# Patient Record
Sex: MALE | NOT HISPANIC OR LATINO | URBAN - METROPOLITAN AREA
[De-identification: names, ages, dates, MRNs, and addresses within clinical notes are randomized per-mention and may not be internally consistent; named-entity substitution may affect disease eponyms.]

---

## 2017-12-14 ENCOUNTER — INPATIENT (INPATIENT)
Facility: HOSPITAL | Age: 27
LOS: 1 days | Discharge: HOME | DRG: 101 | End: 2017-12-16
Attending: PSYCHIATRY & NEUROLOGY | Admitting: PSYCHIATRY & NEUROLOGY
Payer: COMMERCIAL

## 2017-12-14 VITALS
SYSTOLIC BLOOD PRESSURE: 150 MMHG | HEART RATE: 120 BPM | DIASTOLIC BLOOD PRESSURE: 86 MMHG | RESPIRATION RATE: 16 BRPM | TEMPERATURE: 98 F | OXYGEN SATURATION: 95 %

## 2017-12-14 DIAGNOSIS — E87.2 ACIDOSIS: ICD-10-CM

## 2017-12-14 DIAGNOSIS — R56.9 UNSPECIFIED CONVULSIONS: ICD-10-CM

## 2017-12-14 DIAGNOSIS — R63.8 OTHER SYMPTOMS AND SIGNS CONCERNING FOOD AND FLUID INTAKE: ICD-10-CM

## 2017-12-14 DIAGNOSIS — R52 PAIN, UNSPECIFIED: ICD-10-CM

## 2017-12-14 DIAGNOSIS — Z00.00 ENCOUNTER FOR GENERAL ADULT MEDICAL EXAMINATION WITHOUT ABNORMAL FINDINGS: ICD-10-CM

## 2017-12-14 DIAGNOSIS — Z29.9 ENCOUNTER FOR PROPHYLACTIC MEASURES, UNSPECIFIED: ICD-10-CM

## 2017-12-14 LAB
ALBUMIN SERPL ELPH-MCNC: 4.1 G/DL — SIGNIFICANT CHANGE UP (ref 3.4–5)
ALP SERPL-CCNC: 90 U/L — SIGNIFICANT CHANGE UP (ref 40–120)
ALT FLD-CCNC: 41 U/L — SIGNIFICANT CHANGE UP (ref 12–42)
ANION GAP SERPL CALC-SCNC: 7 MMOL/L — LOW (ref 9–16)
APPEARANCE UR: CLEAR — SIGNIFICANT CHANGE UP
AST SERPL-CCNC: 37 U/L — SIGNIFICANT CHANGE UP (ref 15–37)
BILIRUB SERPL-MCNC: 1.4 MG/DL — HIGH (ref 0.2–1.2)
BILIRUB UR-MCNC: NEGATIVE — SIGNIFICANT CHANGE UP
BUN SERPL-MCNC: 11 MG/DL — SIGNIFICANT CHANGE UP (ref 7–23)
CALCIUM SERPL-MCNC: 9.2 MG/DL — SIGNIFICANT CHANGE UP (ref 8.5–10.5)
CHLORIDE SERPL-SCNC: 101 MMOL/L — SIGNIFICANT CHANGE UP (ref 96–108)
CK SERPL-CCNC: 3427 U/L — HIGH (ref 30–200)
CO2 SERPL-SCNC: 30 MMOL/L — SIGNIFICANT CHANGE UP (ref 22–31)
COLOR SPEC: YELLOW — SIGNIFICANT CHANGE UP
CREAT SERPL-MCNC: 1.13 MG/DL — SIGNIFICANT CHANGE UP (ref 0.5–1.3)
DIFF PNL FLD: NEGATIVE — SIGNIFICANT CHANGE UP
ETHANOL SERPL-MCNC: <3 MG/DL — SIGNIFICANT CHANGE UP
GLUCOSE SERPL-MCNC: 107 MG/DL — HIGH (ref 70–99)
GLUCOSE UR QL: NEGATIVE — SIGNIFICANT CHANGE UP
HCT VFR BLD CALC: 45 % — SIGNIFICANT CHANGE UP (ref 39–50)
HGB BLD-MCNC: 15.7 G/DL — SIGNIFICANT CHANGE UP (ref 13–17)
HIV 1+2 AB+HIV1 P24 AG SERPL QL IA: SIGNIFICANT CHANGE UP
KETONES UR-MCNC: NEGATIVE — SIGNIFICANT CHANGE UP
LACTATE SERPL-SCNC: 1.6 MMOL/L — SIGNIFICANT CHANGE UP (ref 0.5–2)
LACTATE SERPL-SCNC: 2.3 MMOL/L — HIGH (ref 0.4–2)
LEUKOCYTE ESTERASE UR-ACNC: NEGATIVE — SIGNIFICANT CHANGE UP
MAGNESIUM SERPL-MCNC: 2.2 MG/DL — SIGNIFICANT CHANGE UP (ref 1.6–2.6)
MCHC RBC-ENTMCNC: 31.6 PG — SIGNIFICANT CHANGE UP (ref 27–34)
MCHC RBC-ENTMCNC: 34.9 G/DL — SIGNIFICANT CHANGE UP (ref 32–36)
MCV RBC AUTO: 90.5 FL — SIGNIFICANT CHANGE UP (ref 80–100)
NITRITE UR-MCNC: NEGATIVE — SIGNIFICANT CHANGE UP
PCP SPEC-MCNC: SIGNIFICANT CHANGE UP
PH UR: 6.5 — SIGNIFICANT CHANGE UP (ref 5–8)
PLATELET # BLD AUTO: 162 K/UL — SIGNIFICANT CHANGE UP (ref 150–400)
POTASSIUM SERPL-MCNC: 3.9 MMOL/L — SIGNIFICANT CHANGE UP (ref 3.5–5.3)
POTASSIUM SERPL-SCNC: 3.9 MMOL/L — SIGNIFICANT CHANGE UP (ref 3.5–5.3)
PROT SERPL-MCNC: 7.1 G/DL — SIGNIFICANT CHANGE UP (ref 6.4–8.2)
PROT UR-MCNC: NEGATIVE MG/DL — SIGNIFICANT CHANGE UP
RBC # BLD: 4.97 M/UL — SIGNIFICANT CHANGE UP (ref 4.2–5.8)
RBC # FLD: 12.5 % — SIGNIFICANT CHANGE UP (ref 10.3–16.9)
SODIUM SERPL-SCNC: 138 MMOL/L — SIGNIFICANT CHANGE UP (ref 132–145)
SP GR SPEC: 1.02 — SIGNIFICANT CHANGE UP (ref 1–1.03)
UROBILINOGEN FLD QL: 0.2 E.U./DL — SIGNIFICANT CHANGE UP
WBC # BLD: 6.7 K/UL — SIGNIFICANT CHANGE UP (ref 3.8–10.5)
WBC # FLD AUTO: 6.7 K/UL — SIGNIFICANT CHANGE UP (ref 3.8–10.5)

## 2017-12-14 PROCEDURE — 73030 X-RAY EXAM OF SHOULDER: CPT | Mod: 26,RT

## 2017-12-14 PROCEDURE — 99223 1ST HOSP IP/OBS HIGH 75: CPT

## 2017-12-14 PROCEDURE — 95951: CPT | Mod: 26

## 2017-12-14 PROCEDURE — 70450 CT HEAD/BRAIN W/O DYE: CPT | Mod: 26

## 2017-12-14 PROCEDURE — 99285 EMERGENCY DEPT VISIT HI MDM: CPT | Mod: 25

## 2017-12-14 RX ORDER — ACETAMINOPHEN 500 MG
650 TABLET ORAL EVERY 6 HOURS
Qty: 0 | Refills: 0 | Status: DISCONTINUED | OUTPATIENT
Start: 2017-12-14 | End: 2017-12-15

## 2017-12-14 RX ORDER — INFLUENZA VIRUS VACCINE 15; 15; 15; 15 UG/.5ML; UG/.5ML; UG/.5ML; UG/.5ML
0.5 SUSPENSION INTRAMUSCULAR ONCE
Qty: 0 | Refills: 0 | Status: COMPLETED | OUTPATIENT
Start: 2017-12-14 | End: 2017-12-14

## 2017-12-14 RX ORDER — TETANUS TOXOID, REDUCED DIPHTHERIA TOXOID AND ACELLULAR PERTUSSIS VACCINE, ADSORBED 5; 2.5; 8; 8; 2.5 [IU]/.5ML; [IU]/.5ML; UG/.5ML; UG/.5ML; UG/.5ML
0.5 SUSPENSION INTRAMUSCULAR ONCE
Qty: 0 | Refills: 0 | Status: COMPLETED | OUTPATIENT
Start: 2017-12-14 | End: 2017-12-14

## 2017-12-14 RX ORDER — SODIUM CHLORIDE 9 MG/ML
3 INJECTION INTRAMUSCULAR; INTRAVENOUS; SUBCUTANEOUS ONCE
Qty: 0 | Refills: 0 | Status: COMPLETED | OUTPATIENT
Start: 2017-12-14 | End: 2017-12-14

## 2017-12-14 RX ORDER — OXYCODONE AND ACETAMINOPHEN 5; 325 MG/1; MG/1
1 TABLET ORAL ONCE
Qty: 0 | Refills: 0 | Status: DISCONTINUED | OUTPATIENT
Start: 2017-12-14 | End: 2017-12-15

## 2017-12-14 RX ORDER — SODIUM CHLORIDE 9 MG/ML
1000 INJECTION INTRAMUSCULAR; INTRAVENOUS; SUBCUTANEOUS ONCE
Qty: 0 | Refills: 0 | Status: COMPLETED | OUTPATIENT
Start: 2017-12-14 | End: 2017-12-14

## 2017-12-14 RX ORDER — KETOROLAC TROMETHAMINE 30 MG/ML
15 SYRINGE (ML) INJECTION ONCE
Qty: 0 | Refills: 0 | Status: DISCONTINUED | OUTPATIENT
Start: 2017-12-14 | End: 2017-12-14

## 2017-12-14 RX ADMIN — SODIUM CHLORIDE 3 MILLILITER(S): 9 INJECTION INTRAMUSCULAR; INTRAVENOUS; SUBCUTANEOUS at 04:06

## 2017-12-14 RX ADMIN — SODIUM CHLORIDE 1000 MILLILITER(S): 9 INJECTION INTRAMUSCULAR; INTRAVENOUS; SUBCUTANEOUS at 04:33

## 2017-12-14 RX ADMIN — Medication 1 MILLIGRAM(S): at 04:20

## 2017-12-14 RX ADMIN — Medication 15 MILLIGRAM(S): at 19:29

## 2017-12-14 RX ADMIN — Medication 15 MILLIGRAM(S): at 22:29

## 2017-12-14 RX ADMIN — TETANUS TOXOID, REDUCED DIPHTHERIA TOXOID AND ACELLULAR PERTUSSIS VACCINE, ADSORBED 0.5 MILLILITER(S): 5; 2.5; 8; 8; 2.5 SUSPENSION INTRAMUSCULAR at 05:47

## 2017-12-14 NOTE — ED PROVIDER NOTE - ATTENDING CONTRIBUTION TO CARE
patient with new onset seizure. labs wnl. discussion made with neuro per NP note. agree with management and admission.
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
## 2017-12-14 NOTE — H&P ADULT - PROBLEM SELECTOR PLAN 1
Pt presented after witnessed seizure   - vEEG in place   - monitor electrolytes Pt presented after witnessed seizure   - vEEG in place   - monitor electrolytes  - f/u CK level  - CT head negative for acute pathology

## 2017-12-14 NOTE — H&P ADULT - PROBLEM SELECTOR PLAN 3
F: No IVF  E: Replete PRN   N: Regular diet - HIV testing   - tobacco cessation   - drug cessation provided for percocet

## 2017-12-14 NOTE — H&P ADULT - PROBLEM SELECTOR PLAN 2
- HIV testing   - tobacco cessation   - drug cessation provided for percocet Elevated to 2.4 likely 2/2 to seizure activity   - will f/u 6pm level, if elevated will start gentle hydration

## 2017-12-14 NOTE — ED ADULT TRIAGE NOTE - CHIEF COMPLAINT QUOTE
Pt BIBEMS after pt had a witnessed seizure at work. Pt was washing dishes at the time and got lacerations to the left 5th finger and right palm. Pt also complaining of upper lip swelling stating "I hit it when I had the seizure."  Reports seizure when he was 19 but never got any medical care at the time.

## 2017-12-14 NOTE — ED PROVIDER NOTE - PROGRESS NOTE DETAILS
Pt discussed with Dr. Montgomery, epileptologist on call.  Dr. Montgomery encourages admission.  Pt amenable. Dr. Montgomery.  [place] discussed via transfer center.

## 2017-12-14 NOTE — ED PROVIDER NOTE - MEDICAL DECISION MAKING DETAILS
26 y/o M presents to ED s/p episode of possible seizure like activity.  Labs wnl, CT head.  Pt advised to avoid driving, operating machinery, drinking alcohol, illicit drug use.

## 2017-12-14 NOTE — ED PROVIDER NOTE - OBJECTIVE STATEMENT
26 y/o M on oxycodone for chronic L knee and lumbar back pain presents to ED s/p episode of loss of consciousness.  The fall was witnessed by sound.  He was then found by coworkers shaking on the floor.  Pt last remembers washing a glass at work and woke up to coworkers telling him to stand up.  He states he had an episode of sudden loss of consciousness approx 5 years ago.  Pt has bite deisi to upper lip.  He c/o mild headache and pain to small laceration to L small finger and R wrist.  He denies urinary incontinence, neck or back pain, illicit drug use.

## 2017-12-14 NOTE — H&P ADULT - NSHPSOCIALHISTORY_GEN_ALL_CORE
Smokes 5 cigarettes a day Smokes 5 cigarettes a day since he was 20, but inconsistently   EtOH denies use  Illicit drug use- marijuana daily, admits to various other drugs over the past year   Drugs: pt had knee surgery in the past for which he was prescribed percocet. He states that he buys them on the streets now for "pain" however takes them intermittently   Denies recent sexual activity, however has been sexually active in the past year with women, uses protection, no STI

## 2017-12-14 NOTE — H&P ADULT - ASSESSMENT
This is a 27 year old male with no significant past medical history who presents from [facility] after a witnessed seizure. Admitted for vEEG This is a 27 year old male with no significant past medical history who presents from [facility] after a witnessed seizure. Possible post-traumatic due to history of significant concussion. No MRI recently.  CT head negative.  Admitted for vEEG and consideration for treatment.

## 2017-12-14 NOTE — H&P ADULT - PROBLEM SELECTOR PLAN 6
Patient reporting chronic left knee pain, which he obtains and takes percocet up to 3 tabs/week.  Now with left hip flexor pain, no rebound etc, appears tendon/muscle.    Will first try toradol as anti-inflammatory, but one oxycodone 5mg/d PRN

## 2017-12-14 NOTE — ED PROVIDER NOTE - NS_EDPROVIDERDISPOUSERTYPE_ED_A_ED
I have personally evaluated and examined the patient. The Attending was available to me as a supervising provider if needed. Attending Attestation (For Attendings USE Only).../I have personally evaluated and examined the patient. The Attending was available to me as a supervising provider if needed. Attending Attestation (For Attendings USE Only)...

## 2017-12-14 NOTE — H&P ADULT - NSHPPHYSICALEXAM_GEN_ALL_CORE
.  VITAL SIGNS:  T(F): 97.9 (12-14-17 @ 15:59), Max: 98.7 (12-14-17 @ 05:51)  HR: 87 (12-14-17 @ 15:59) (62 - 120)  BP: 128/71 (12-14-17 @ 15:59) (118/74 - 150/86)  BP(mean): --  RR: 15 (12-14-17 @ 15:59) (15 - 18)  SpO2: 97% (12-14-17 @ 15:59) (95% - 100%)    PHYSICAL EXAM:    Constitutional: WDWN resting comfortably in bed; NAD  HEENT: NC/AT, PERRL, EOMI, anicteric sclera, no nasal discharge; uvula midline, no oropharyngeal erythema or exudates; MMM  Neck: supple; no JVD or thyromegaly  Respiratory: CTA B/L; no W/R/R, no retractions  Cardiac: +S1/S2; RRR; no M/R/G; PMI non-displaced  Gastrointestinal: soft, NT/ND; no rebound or guarding; +BSx4  Back: no CVAT B/L  Extremities: WWP, no clubbing or cyanosis; no peripheral edema  Musculoskeletal: NROM x4; no joint swelling, tenderness or erythema  Vascular: 2+ radial, DP/PT pulses B/L  Dermatologic: skin warm, dry and intact; no rashes, wounds, or scars  Neurologic: AAOx3; CNII-XII grossly intact; no focal deficits, 5/5 strength in all four extremities, sensation intact throughout, no dysmetria on finger to nose test  Psychiatric: affect and characteristics of appearance, verbalizations, behaviors are appropriate, denies SI/HI/AH/VH .  VITAL SIGNS:  T(F): 97.9 (12-14-17 @ 15:59), Max: 98.7 (12-14-17 @ 05:51)  HR: 87 (12-14-17 @ 15:59) (62 - 120)  BP: 128/71 (12-14-17 @ 15:59) (118/74 - 150/86)  BP(mean): --  RR: 15 (12-14-17 @ 15:59) (15 - 18)  SpO2: 97% (12-14-17 @ 15:59) (95% - 100%)    PHYSICAL EXAM:    Constitutional: WDWN resting comfortably in bed; NAD  HEENT: NC/AT, PERRL, EOMI, anicteric sclera, no nasal discharge; uvula midline, no oropharyngeal erythema or exudates; MMM  Neck: supple; no JVD or thyromegaly  Respiratory: CTA B/L; no W/R/R, no retractions  Cardiac: +S1/S2; RRR; no M/R/G; PMI non-displaced  Gastrointestinal: soft, NT/ND; no rebound or guarding; +BSx4  Back: no CVAT B/L  Extremities: WWP, no clubbing or cyanosis; no peripheral edema  Musculoskeletal: NROM x4; no joint swelling, tenderness or erythema; c/o left hip flexor pain/weakness when attempting to lift it.  Vascular: 2+ radial, DP/PT pulses B/L  Dermatologic: skin warm, dry and intact; no rashes, wounds, or scars  Neurologic: AAOx3; CNII-XII grossly intact; no focal deficits, 5/5 strength in all four extremities, sensation intact throughout, no dysmetria on finger to nose test;  Left HF tested with full power, though some pain.  Psychiatric: affect and characteristics of appearance, verbalizations, behaviors are appropriate, denies SI/HI/AH/VH

## 2017-12-14 NOTE — H&P ADULT - NSHPLABSRESULTS_GEN_ALL_CORE
.  LABS:                         15.7   6.7   )-----------( 162      ( 14 Dec 2017 03:49 )             45.0     12-    138  |  101  |  11  ----------------------------<  107<H>  3.9   |  30  |  1.13    Ca    9.2      14 Dec 2017 03:49    TPro  7.1  /  Alb  4.1  /  TBili  1.4<H>  /  DBili  x   /  AST  37  /  ALT  41  /  AlkPhos  90  12-      Urinalysis Basic - ( 14 Dec 2017 05:38 )    Color: Yellow / Appearance: Clear / S.025 / pH: x  Gluc: x / Ketone: NEGATIVE  / Bili: NEGATIVE / Urobili: 0.2 E.U./dL   Blood: x / Protein: NEGATIVE mg/dL / Nitrite: NEGATIVE   Leuk Esterase: NEGATIVE / RBC: x / WBC x   Sq Epi: x / Non Sq Epi: x / Bacteria: x                RADIOLOGY, EKG & ADDITIONAL TESTS: Reviewed.   < from: CT Head No Cont (17 @ 04:08) >    IMPRESSION:     No acute intracranial hemorrhagic or ischemic infarction. No mass or   hydrocephaly.    < end of copied text >

## 2017-12-15 ENCOUNTER — TRANSCRIPTION ENCOUNTER (OUTPATIENT)
Age: 27
End: 2017-12-15

## 2017-12-15 DIAGNOSIS — G47.26 CIRCADIAN RHYTHM SLEEP DISORDER, SHIFT WORK TYPE: ICD-10-CM

## 2017-12-15 LAB
ALBUMIN SERPL ELPH-MCNC: 4.5 G/DL — SIGNIFICANT CHANGE UP (ref 3.3–5)
ALP SERPL-CCNC: 75 U/L — SIGNIFICANT CHANGE UP (ref 40–120)
ALT FLD-CCNC: 25 U/L — SIGNIFICANT CHANGE UP (ref 10–45)
ANION GAP SERPL CALC-SCNC: 12 MMOL/L — SIGNIFICANT CHANGE UP (ref 5–17)
AST SERPL-CCNC: 41 U/L — HIGH (ref 10–40)
BASOPHILS NFR BLD AUTO: 0.3 % — SIGNIFICANT CHANGE UP (ref 0–2)
BILIRUB SERPL-MCNC: 1.3 MG/DL — HIGH (ref 0.2–1.2)
BUN SERPL-MCNC: 13 MG/DL — SIGNIFICANT CHANGE UP (ref 7–23)
CALCIUM SERPL-MCNC: 9.4 MG/DL — SIGNIFICANT CHANGE UP (ref 8.4–10.5)
CHLORIDE SERPL-SCNC: 100 MMOL/L — SIGNIFICANT CHANGE UP (ref 96–108)
CK SERPL-CCNC: 2456 U/L — HIGH (ref 30–200)
CO2 SERPL-SCNC: 26 MMOL/L — SIGNIFICANT CHANGE UP (ref 22–31)
CREAT SERPL-MCNC: 0.88 MG/DL — SIGNIFICANT CHANGE UP (ref 0.5–1.3)
EOSINOPHIL NFR BLD AUTO: 1.5 % — SIGNIFICANT CHANGE UP (ref 0–6)
GLUCOSE SERPL-MCNC: 90 MG/DL — SIGNIFICANT CHANGE UP (ref 70–99)
HCT VFR BLD CALC: 44.9 % — SIGNIFICANT CHANGE UP (ref 39–50)
HGB BLD-MCNC: 15.6 G/DL — SIGNIFICANT CHANGE UP (ref 13–17)
LYMPHOCYTES # BLD AUTO: 28.7 % — SIGNIFICANT CHANGE UP (ref 13–44)
MAGNESIUM SERPL-MCNC: 2.3 MG/DL — SIGNIFICANT CHANGE UP (ref 1.6–2.6)
MCHC RBC-ENTMCNC: 31.8 PG — SIGNIFICANT CHANGE UP (ref 27–34)
MCHC RBC-ENTMCNC: 34.7 G/DL — SIGNIFICANT CHANGE UP (ref 32–36)
MCV RBC AUTO: 91.4 FL — SIGNIFICANT CHANGE UP (ref 80–100)
MONOCYTES NFR BLD AUTO: 8.6 % — SIGNIFICANT CHANGE UP (ref 2–14)
NEUTROPHILS NFR BLD AUTO: 60.9 % — SIGNIFICANT CHANGE UP (ref 43–77)
PLATELET # BLD AUTO: 150 K/UL — SIGNIFICANT CHANGE UP (ref 150–400)
POTASSIUM SERPL-MCNC: 4 MMOL/L — SIGNIFICANT CHANGE UP (ref 3.5–5.3)
POTASSIUM SERPL-SCNC: 4 MMOL/L — SIGNIFICANT CHANGE UP (ref 3.5–5.3)
PROT SERPL-MCNC: 6.9 G/DL — SIGNIFICANT CHANGE UP (ref 6–8.3)
RBC # BLD: 4.91 M/UL — SIGNIFICANT CHANGE UP (ref 4.2–5.8)
RBC # FLD: 13.3 % — SIGNIFICANT CHANGE UP (ref 10.3–16.9)
SODIUM SERPL-SCNC: 138 MMOL/L — SIGNIFICANT CHANGE UP (ref 135–145)
WBC # BLD: 6.5 K/UL — SIGNIFICANT CHANGE UP (ref 3.8–10.5)
WBC # FLD AUTO: 6.5 K/UL — SIGNIFICANT CHANGE UP (ref 3.8–10.5)

## 2017-12-15 PROCEDURE — 99232 SBSQ HOSP IP/OBS MODERATE 35: CPT

## 2017-12-15 PROCEDURE — 95951: CPT | Mod: 26

## 2017-12-15 RX ORDER — ZONISAMIDE 100 MG
25 CAPSULE ORAL
Qty: 0 | Refills: 0 | Status: DISCONTINUED | OUTPATIENT
Start: 2017-12-15 | End: 2017-12-16

## 2017-12-15 RX ORDER — KETOROLAC TROMETHAMINE 30 MG/ML
15 SYRINGE (ML) INJECTION ONCE
Qty: 0 | Refills: 0 | Status: DISCONTINUED | OUTPATIENT
Start: 2017-12-15 | End: 2017-12-15

## 2017-12-15 RX ORDER — IBUPROFEN 200 MG
400 TABLET ORAL EVERY 6 HOURS
Qty: 0 | Refills: 0 | Status: DISCONTINUED | OUTPATIENT
Start: 2017-12-15 | End: 2017-12-16

## 2017-12-15 RX ORDER — ZONISAMIDE 100 MG
25 CAPSULE ORAL ONCE
Qty: 0 | Refills: 0 | Status: COMPLETED | OUTPATIENT
Start: 2017-12-15 | End: 2017-12-15

## 2017-12-15 RX ADMIN — Medication 15 MILLIGRAM(S): at 21:58

## 2017-12-15 RX ADMIN — Medication 25 MILLIGRAM(S): at 21:58

## 2017-12-15 RX ADMIN — OXYCODONE AND ACETAMINOPHEN 1 TABLET(S): 5; 325 TABLET ORAL at 06:12

## 2017-12-15 RX ADMIN — OXYCODONE AND ACETAMINOPHEN 1 TABLET(S): 5; 325 TABLET ORAL at 01:47

## 2017-12-15 RX ADMIN — Medication 650 MILLIGRAM(S): at 08:40

## 2017-12-15 RX ADMIN — Medication 25 MILLIGRAM(S): at 12:53

## 2017-12-15 RX ADMIN — Medication 650 MILLIGRAM(S): at 08:56

## 2017-12-15 RX ADMIN — Medication 15 MILLIGRAM(S): at 22:13

## 2017-12-15 NOTE — PROGRESS NOTE ADULT - PROBLEM SELECTOR PLAN 6
Patient reporting chronic left knee pain, which he obtains and takes percocet up to 3 tabs/week.  Now with left hip flexor pain, no rebound etc, appears tendon/muscle.    Will first try toradol as anti-inflammatory, but one oxycodone 5mg/d PRN DVT: IMPROVE 0, ICDs    Full code  Dispo: 7Wo

## 2017-12-15 NOTE — PROGRESS NOTE ADULT - SUBJECTIVE AND OBJECTIVE BOX
INTERVAL HPI/OVERNIGHT EVENTS:  Patient was seen and examined at bedside. As per nurse and patient, no o/n events, patient resting comfortably. Patient denies: fever, chills, dizziness, weakness,  Changes in vision, CP, palpitations, SOB, cough, N/V/D/C, dysuria, changes in bowel movements, LE edema. Pt denies any convulsions at this time. He notes that he is having right shoulder and left hip pain, likely from where he fell    VITAL SIGNS:  T(F): 97.3 (12-15-17 @ 05:22)  HR: 73 (12-15-17 @ 05:22)  BP: 130/75 (12-15-17 @ 05:22)  RR: 15 (12-15-17 @ 05:22)  SpO2: 97% (12-15-17 @ :22)  Wt(kg): --    PHYSICAL EXAM:    Constitutional: WDWN, NAD  Eyes: PERRL, EOMI, sclera non-icteric  Neck: supple, trachea midline, no masses, no JVD  Respiratory: CTA b/l, good air entry b/l, no wheezing, no rhonchi, no rales, without accessory muscle use and no intercostal retractions  Cardiovascular: RRR, normal S1S2, no M/R/G  Gastrointestinal: soft, NTND, no masses palpable, BS normal  Extremities: Warm, well perfused, pulses equal bilateral upper and lower extremities, no edema, no clubbing  Neurological: AAOx3, CN Grossly intact  Skin: Normal temperature, warm, dry    MEDICATIONS  (STANDING):    MEDICATIONS  (PRN):  acetaminophen   Tablet. 650 milliGRAM(s) Oral every 6 hours PRN Moderate Pain (4 - 6)  LORazepam   Injectable 1 milliGRAM(s) IntraMuscular once PRN seizure      Allergies    No Known Allergies    Intolerances        LABS:                        15.6   6.5   )-----------( 150      ( 15 Dec 2017 07:39 )             44.9     -15    138  |  100  |  13  ----------------------------<  90  4.0   |  26  |  0.88    Ca    9.4      15 Dec 2017 07:39  Mg     2.3     12-15    TPro  6.9  /  Alb  4.5  /  TBili  1.3<H>  /  DBili  x   /  AST  41<H>  /  ALT  25  /  AlkPhos  75  12-15      Urinalysis Basic - ( 14 Dec 2017 05:38 )    Color: Yellow / Appearance: Clear / S.025 / pH: x  Gluc: x / Ketone: NEGATIVE  / Bili: NEGATIVE / Urobili: 0.2 E.U./dL   Blood: x / Protein: NEGATIVE mg/dL / Nitrite: NEGATIVE   Leuk Esterase: NEGATIVE / RBC: x / WBC x   Sq Epi: x / Non Sq Epi: x / Bacteria: x        RADIOLOGY & ADDITIONAL TESTS: INTERVAL HPI/OVERNIGHT EVENTS:  Patient was seen and examined at bedside. As per nurse and patient, no o/n events, patient resting comfortably. Patient denies: fever, chills, dizziness, weakness,  Changes in vision, CP, palpitations, SOB, cough, N/V/D/C, dysuria, changes in bowel movements, LE edema. Pt denies any convulsions at this time. He notes that he is having right shoulder and left hip pain, likely from where he fell. He notes that he has a HA in the occipital region, unsure if it is a HA vs where he fell vs EEG leads. Currently he does not have a HA.     VITAL SIGNS:  T(F): 97.3 (12-15-17 @ 05:22)  HR: 73 (12-15-17 @ 05:22)  BP: 130/75 (12-15-17 @ 05:22)  RR: 15 (12-15-17 @ 05:22)  SpO2: 97% (12-15-17 @ 05:22)  Wt(kg): --    PHYSICAL EXAM:    Constitutional: WDWN resting comfortably in bed; NAD  HEENT: NC/AT, PERRL, EOMI, anicteric sclera, no nasal discharge; uvula midline, no oropharyngeal erythema or exudates; MMM  Neck: supple; no JVD or thyromegaly  Respiratory: CTA B/L; no W/R/R, no retractions  Cardiac: +S1/S2; RRR; no M/R/G; PMI non-displaced  Gastrointestinal: soft, NT/ND; no rebound or guarding; +BSx4  Back: no CVAT B/L  Extremities: WWP, no clubbing or cyanosis; no peripheral edema  Musculoskeletal: NROM x4; no joint swelling, tenderness or erythema; c/o left hip flexor pain/weakness when attempting to lift it.  Vascular: 2+ radial, DP/PT pulses B/L  Dermatologic: skin warm, dry and intact; no rashes, wounds, or scars  Neurologic: AAOx3; CNII-XII grossly intact; no focal deficits, 5/5 strength in all four extremities, sensation intact throughout, no dysmetria on finger to nose test;  Left HF tested with full power, though some pain.  Psychiatric: affect and characteristics of appearance, verbalizations, behaviors are appropriate, denies SI/HI/AH/VH    MEDICATIONS  (STANDING):    MEDICATIONS  (PRN):  acetaminophen   Tablet. 650 milliGRAM(s) Oral every 6 hours PRN Moderate Pain (4 - 6)  LORazepam   Injectable 1 milliGRAM(s) IntraMuscular once PRN seizure      Allergies    No Known Allergies    Intolerances        LABS:                        15.6   6.5   )-----------( 150      ( 15 Dec 2017 07:39 )             44.9     12-15    138  |  100  |  13  ----------------------------<  90  4.0   |  26  |  0.88    Ca    9.4      15 Dec 2017 07:39  Mg     2.3     12-15    TPro  6.9  /  Alb  4.5  /  TBili  1.3<H>  /  DBili  x   /  AST  41<H>  /  ALT  25  /  AlkPhos  75  12-15      Urinalysis Basic - ( 14 Dec 2017 05:38 )    Color: Yellow / Appearance: Clear / S.025 / pH: x  Gluc: x / Ketone: NEGATIVE  / Bili: NEGATIVE / Urobili: 0.2 E.U./dL   Blood: x / Protein: NEGATIVE mg/dL / Nitrite: NEGATIVE   Leuk Esterase: NEGATIVE / RBC: x / WBC x   Sq Epi: x / Non Sq Epi: x / Bacteria: x        RADIOLOGY & ADDITIONAL TESTS: INTERVAL HPI/OVERNIGHT EVENTS:  Patient was seen and examined at bedside. As per nurse and patient, no o/n events, patient resting comfortably. Patient denies: fever, chills, dizziness, weakness,  Changes in vision, CP, palpitations, SOB, cough, N/V/D/C, dysuria, changes in bowel movements, LE edema. Pt denies any convulsions at this time. He notes that he is having right shoulder and left hip pain, likely from where he fell. He notes that he has a HA in the occipital region, unsure if it is a HA vs where he fell vs EEG leads, though does note the left occipital region. Currently he does not have a HA.     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    He reports having extremely difficulty with his sleep cycle, working nights and coming home and often not being able to sleep due to light and restlessness.    VITAL SIGNS:  T(F): 97.3 (12-15-17 @ 05:22)  HR: 73 (12-15-17 @ 05:22)  BP: 130/75 (12-15-17 @ 05:22)  RR: 15 (12-15-17 @ 05:22)  SpO2: 97% (12-15-17 @ 05:22)  Wt(kg): --    PHYSICAL EXAM:    Constitutional: WDWN resting comfortably in bed; NAD  HEENT: NC/AT, PERRL, EOMI, anicteric sclera, no nasal discharge; uvula midline, no oropharyngeal erythema or exudates; MMM  Neck: supple; no JVD or thyromegaly  Respiratory: CTA B/L; no W/R/R, no retractions  Cardiac: +S1/S2; RRR; no M/R/G; PMI non-displaced  Gastrointestinal: soft, NT/ND; no rebound or guarding; +BSx4  Back: no CVAT B/L  Extremities: WWP, no clubbing or cyanosis; no peripheral edema  Musculoskeletal: NROM x4; no joint swelling, tenderness or erythema; c/o left hip flexor pain, reproducible with palpation of the muscle belly (?iliacus)  Vascular: 2+ radial, DP/PT pulses B/L  Dermatologic: skin warm, dry and intact; no rashes, wounds, or scars  Neurologic: AAOx3; CNII-XII grossly intact; no focal deficits, 5/5 strength in all four extremities, sensation intact throughout, no dysmetria on finger to nose test;  Left HF tested with full power, though some pain.  Psychiatric: affect and characteristics of appearance, verbalizations, behaviors are appropriate, denies SI/HI/AH/VH    MEDICATIONS  (STANDING):    MEDICATIONS  (PRN):  acetaminophen   Tablet. 650 milliGRAM(s) Oral every 6 hours PRN Moderate Pain (4 - 6)  LORazepam   Injectable 1 milliGRAM(s) IntraMuscular once PRN seizure      Allergies    No Known Allergies    Intolerances        LABS:                        15.6   6.5   )-----------( 150      ( 15 Dec 2017 07:39 )             44.9     12-15    138  |  100  |  13  ----------------------------<  90  4.0   |  26  |  0.88    Ca    9.4      15 Dec 2017 07:39  Mg     2.3     12-15    TPro  6.9  /  Alb  4.5  /  TBili  1.3<H>  /  DBili  x   /  AST  41<H>  /  ALT  25  /  AlkPhos  75  12-15      Urinalysis Basic - ( 14 Dec 2017 05:38 )    Color: Yellow / Appearance: Clear / S.025 / pH: x  Gluc: x / Ketone: NEGATIVE  / Bili: NEGATIVE / Urobili: 0.2 E.U./dL   Blood: x / Protein: NEGATIVE mg/dL / Nitrite: NEGATIVE   Leuk Esterase: NEGATIVE / RBC: x / WBC x   Sq Epi: x / Non Sq Epi: x / Bacteria: x        RADIOLOGY & ADDITIONAL TESTS:

## 2017-12-15 NOTE — PROGRESS NOTE ADULT - PROBLEM SELECTOR PLAN 4
F: No IVF  E: Replete PRN   N: Regular diet - HIV testing - negative   - tobacco cessation   - drug cessation provided for percocet

## 2017-12-15 NOTE — PROGRESS NOTE ADULT - PROBLEM SELECTOR PLAN 7
1) recommended melatonin 3-5mg 3 hours prior to sleep  2) needs sensory reduction - black-out blinds or eyemask, earplugs  3) consider insomnia meds if ineffective

## 2017-12-15 NOTE — DISCHARGE NOTE ADULT - PATIENT PORTAL LINK FT
“You can access the [facility] Patient Portal, offered by [facility], by registering with the following website: [email or web address]”

## 2017-12-15 NOTE — PROGRESS NOTE ADULT - PROBLEM SELECTOR PLAN 1
Pt presented after witnessed seizure   - vEEG in place   - monitor electrolytes  - f/u CK level- downtrending no need for further trending   - CT head negative for acute pathology  - started on Zonegran 25MG BID, will uptitrate tomorrow

## 2017-12-15 NOTE — PROGRESS NOTE ADULT - PROBLEM SELECTOR PLAN 3
- HIV testing - negative   - tobacco cessation   - drug cessation provided for percocet RESOLVED   Elevated to 2.4 likely 2/2 to seizure activity , downtrended to 1.6

## 2017-12-15 NOTE — DISCHARGE NOTE ADULT - HOSPITAL COURSE
This is a 27 year old male with no significant past medical history who presents from [facility] after a witnessed seizure. Pt admitted for vEEG and started on Zongran BID. Pt monitored on vEEG for 48 hours with minimal activity. Vitals have remained stable and labs WNL. Pt will follow up with Dr. Montgomery in 1-2 weeks. Patient was medically optimized, stable, and ready for discharge. Plan of care and return precautions were discussed with the patient who verbally stated understanding. This is a 27 year old male with no significant past medical history who presents from [facility] after a witnessed seizure.  Pt admitted for vEEG and started on Zonegran BID. Pt monitored on vEEG for 48 hours with minimal activity. Vitals have remained stable and labs WNL. Pt will follow up with Dr. Montgomery next week. Patient was medically optimized, stable, and ready for discharge. Plan of care and return precautions were discussed with the patient who verbally stated understanding. This is a 27 year old male with no significant past medical history who presents from [facility] after a witnessed seizure.  Pt admitted for vEEG and started on Zonegran 25mg BID. Pt monitored on vEEG for 48 hours with possible F4 focal irritability. Vitals have remained stable and labs WNL. Pt will follow up with Dr. Montgomery next week. Patient was medically optimized, dose increased to ZNS 100mg at bedtime, and ready for discharge. Plan of care and return precautions were discussed with the patient and his mother who both verbally stated understanding.

## 2017-12-15 NOTE — DISCHARGE NOTE ADULT - PLAN OF CARE
Discharge to home. To remain seizure free. You presented after a witnessed seizure at work. You were brought to the hospital and monitored on video EEG for two nights. You were also started on Zonisamide while in the hospital. Please continue to take the medication as prescribed. Please continue to follow up with Dr. Montgomery in 1-2 weeks for post hospitalization check up. Resolved. You presented with an elevated lactate which was likely due to your seizure activity. This resolved to normal fairly quickly with no intervention needed. Stable. You have difficulty with your sleep cycle given being on an apposite schedule for work. We discussed options such as black out curtains, eye masks, and ear buds to help facilitate a good sleep cycle. Please try to implement healthy sleep hygiene to ensure that you are getting enough sleep. If you have any questions, please feel free to contact Dr. Montgomery. You presented after a witnessed seizure at work. You were brought to the hospital and monitored on video EEG for two nights. You were also started on Zonisamide while in the hospital. Please continue to take Zonisamide 100mg at bedtime.  Additionally, follow up with Dr. Montgomery next week for post hospitalization check up. You presented after a witnessed seizure at work. You were brought to the hospital and monitored on video EEG for two nights. You were also started on Zonisamide while in the hospital. Please continue with the increased dose of Zonisamide 100mg at bedtime.  Additionally, follow up with Dr. Montgomery next week for post-hospitalization check up for clearance for work.

## 2017-12-15 NOTE — PROGRESS NOTE ADULT - ASSESSMENT
This is a 27 year old male with no significant past medical history who presents from [facility] after a witnessed seizure. Possible post-traumatic due to history of significant concussion. No MRI recently.  CT head negative.  Admitted for vEEG and consideration for treatment. This is a 27 year old male with no significant past medical history who presents from [facility] after a witnessed seizure, his second in his life. Possible post-traumatic due to history of significant concussion. No MRI recently.  CT head negative.  Admitted for vEEG and consideration for treatment.

## 2017-12-15 NOTE — DISCHARGE NOTE ADULT - CARE PROVIDER_API CALL
Kam Montgomery (MD), Clinical Neurophysiology; EEGEpilepsy; Neurology; Sleep Medicine  [address] [place] [address]  Phone: [phone]  Fax: [phone]

## 2017-12-15 NOTE — DISCHARGE NOTE ADULT - CARE PLAN
Principal Discharge DX:	Seizure  Secondary Diagnosis:	Lactic acidosis  Secondary Diagnosis:	Preventative health care  Secondary Diagnosis:	Sleep disorder, circadian, shift work type Principal Discharge DX:	Seizure  Goal:	Discharge to home. To remain seizure free.  Instructions for follow-up, activity and diet:	You presented after a witnessed seizure at work. You were brought to the hospital and monitored on video EEG for two nights. You were also started on Zonisamide while in the hospital. Please continue to take the medication as prescribed. Please continue to follow up with Dr. Montgomery in 1-2 weeks for post hospitalization check up.  Secondary Diagnosis:	Lactic acidosis  Goal:	Resolved.  Instructions for follow-up, activity and diet:	You presented with an elevated lactate which was likely due to your seizure activity. This resolved to normal fairly quickly with no intervention needed.  Secondary Diagnosis:	Sleep disorder, circadian, shift work type  Goal:	Stable.  Instructions for follow-up, activity and diet:	You have difficulty with your sleep cycle given being on an apposite schedule for work. We discussed options such as black out curtains, eye masks, and ear buds to help facilitate a good sleep cycle. Please try to implement healthy sleep hygiene to ensure that you are getting enough sleep. If you have any questions, please feel free to contact Dr. Montgomery. Principal Discharge DX:	Seizure  Goal:	Discharge to home. To remain seizure free.  Instructions for follow-up, activity and diet:	You presented after a witnessed seizure at work. You were brought to the hospital and monitored on video EEG for two nights. You were also started on Zonisamide while in the hospital. Please continue to take Zonisamide 100mg at bedtime.  Additionally, follow up with Dr. Montgomery next week for post hospitalization check up.  Secondary Diagnosis:	Lactic acidosis  Goal:	Resolved.  Instructions for follow-up, activity and diet:	You presented with an elevated lactate which was likely due to your seizure activity. This resolved to normal fairly quickly with no intervention needed.  Secondary Diagnosis:	Sleep disorder, circadian, shift work type  Goal:	Stable.  Instructions for follow-up, activity and diet:	You have difficulty with your sleep cycle given being on an apposite schedule for work. We discussed options such as black out curtains, eye masks, and ear buds to help facilitate a good sleep cycle. Please try to implement healthy sleep hygiene to ensure that you are getting enough sleep. If you have any questions, please feel free to contact Dr. Montgomery. Principal Discharge DX:	Seizure  Goal:	Discharge to home. To remain seizure free.  Instructions for follow-up, activity and diet:	You presented after a witnessed seizure at work. You were brought to the hospital and monitored on video EEG for two nights. You were also started on Zonisamide while in the hospital. Please continue with the increased dose of Zonisamide 100mg at bedtime.  Additionally, follow up with Dr. Montgomery next week for post-hospitalization check up for clearance for work.  Secondary Diagnosis:	Lactic acidosis  Goal:	Resolved.  Instructions for follow-up, activity and diet:	You presented with an elevated lactate which was likely due to your seizure activity. This resolved to normal fairly quickly with no intervention needed.  Secondary Diagnosis:	Sleep disorder, circadian, shift work type  Goal:	Stable.  Instructions for follow-up, activity and diet:	You have difficulty with your sleep cycle given being on an apposite schedule for work. We discussed options such as black out curtains, eye masks, and ear buds to help facilitate a good sleep cycle. Please try to implement healthy sleep hygiene to ensure that you are getting enough sleep. If you have any questions, please feel free to contact Dr. Montgomery.

## 2017-12-15 NOTE — DISCHARGE NOTE ADULT - ADDITIONAL INSTRUCTIONS
Please follow up with Dr. Montgomery in 1-2 weeks for follow up appointment. Please follow up with Dr. Montgomery within a week for follow up appointment.

## 2017-12-15 NOTE — DISCHARGE NOTE ADULT - MEDICATION SUMMARY - MEDICATIONS TO TAKE
I will START or STAY ON the medications listed below when I get home from the hospital:    zonisamide 100 mg oral capsule  -- 1 cap(s) by mouth once a day (at bedtime)   -- Check with your doctor before becoming pregnant.  It is very important that you take or use this exactly as directed.  Do not skip doses or discontinue unless directed by your doctor.  May cause drowsiness.  Alcohol may intensify this effect.  Use care when operating dangerous machinery.  Swallow whole.  Do not crush.  This drug may impair the ability to drive or operate machinery.  Use care until you become familiar with its effects.    -- Indication: For Shift work sleep disorder

## 2017-12-15 NOTE — PROGRESS NOTE ADULT - PROBLEM SELECTOR PLAN 2
RESOLVED   Elevated to 2.4 likely 2/2 to seizure activity , downtrended to 1.6 Patient reporting chronic left knee pain, which he obtains and takes percocet up to 3 tabs/week.  Now with left hip flexor pain, no rebound etc, appears tendon/muscle.    - Toradol and 5mg Oxy given ON for pain   - tylenol PRN for pain Patient reporting chronic left knee pain, which he obtains and takes percocet up to 3 tabs/week.  Now with left hip flexor pain, no rebound etc, appears tendon/muscle.    - Toradol and 5mg Oxy given ON for pain   - will give NSAIDs for inflammatory pain

## 2017-12-16 VITALS
OXYGEN SATURATION: 96 % | HEART RATE: 71 BPM | DIASTOLIC BLOOD PRESSURE: 70 MMHG | SYSTOLIC BLOOD PRESSURE: 133 MMHG | RESPIRATION RATE: 15 BRPM | TEMPERATURE: 98 F

## 2017-12-16 PROCEDURE — 80307 DRUG TEST PRSMV CHEM ANLYZR: CPT

## 2017-12-16 PROCEDURE — 81003 URINALYSIS AUTO W/O SCOPE: CPT

## 2017-12-16 PROCEDURE — 82962 GLUCOSE BLOOD TEST: CPT

## 2017-12-16 PROCEDURE — 86900 BLOOD TYPING SEROLOGIC ABO: CPT

## 2017-12-16 PROCEDURE — 99238 HOSP IP/OBS DSCHRG MGMT 30/<: CPT

## 2017-12-16 PROCEDURE — 85027 COMPLETE CBC AUTOMATED: CPT

## 2017-12-16 PROCEDURE — 73030 X-RAY EXAM OF SHOULDER: CPT

## 2017-12-16 PROCEDURE — 70450 CT HEAD/BRAIN W/O DYE: CPT

## 2017-12-16 PROCEDURE — 95951: CPT

## 2017-12-16 PROCEDURE — 86901 BLOOD TYPING SEROLOGIC RH(D): CPT

## 2017-12-16 PROCEDURE — 82550 ASSAY OF CK (CPK): CPT

## 2017-12-16 PROCEDURE — 99285 EMERGENCY DEPT VISIT HI MDM: CPT | Mod: 25

## 2017-12-16 PROCEDURE — 90715 TDAP VACCINE 7 YRS/> IM: CPT

## 2017-12-16 PROCEDURE — 87389 HIV-1 AG W/HIV-1&-2 AB AG IA: CPT

## 2017-12-16 PROCEDURE — 90471 IMMUNIZATION ADMIN: CPT

## 2017-12-16 PROCEDURE — 85025 COMPLETE CBC W/AUTO DIFF WBC: CPT

## 2017-12-16 PROCEDURE — 80053 COMPREHEN METABOLIC PANEL: CPT

## 2017-12-16 PROCEDURE — 83735 ASSAY OF MAGNESIUM: CPT

## 2017-12-16 PROCEDURE — 86850 RBC ANTIBODY SCREEN: CPT

## 2017-12-16 PROCEDURE — 95951: CPT | Mod: 26,52

## 2017-12-16 PROCEDURE — 83605 ASSAY OF LACTIC ACID: CPT

## 2017-12-16 PROCEDURE — 36415 COLL VENOUS BLD VENIPUNCTURE: CPT

## 2017-12-16 RX ORDER — ZONISAMIDE 100 MG
1 CAPSULE ORAL
Qty: 30 | Refills: 0 | OUTPATIENT
Start: 2017-12-16 | End: 2018-01-14

## 2017-12-16 RX ADMIN — Medication 25 MILLIGRAM(S): at 07:04

## 2017-12-19 ENCOUNTER — APPOINTMENT (OUTPATIENT)
Dept: NEUROLOGY | Facility: CLINIC | Age: 27
End: 2017-12-19
Payer: COMMERCIAL

## 2017-12-19 VITALS
HEIGHT: 74.5 IN | SYSTOLIC BLOOD PRESSURE: 122 MMHG | OXYGEN SATURATION: 97 % | BODY MASS INDEX: 23.87 KG/M2 | TEMPERATURE: 98 F | DIASTOLIC BLOOD PRESSURE: 87 MMHG | WEIGHT: 188 LBS | HEART RATE: 90 BPM

## 2017-12-19 DIAGNOSIS — F17.210 NICOTINE DEPENDENCE, CIGARETTES, UNCOMPLICATED: ICD-10-CM

## 2017-12-19 DIAGNOSIS — G89.29 OTHER CHRONIC PAIN: ICD-10-CM

## 2017-12-19 DIAGNOSIS — E87.2 ACIDOSIS: ICD-10-CM

## 2017-12-19 DIAGNOSIS — F11.10 OPIOID ABUSE, UNCOMPLICATED: ICD-10-CM

## 2017-12-19 DIAGNOSIS — G47.26 CIRCADIAN RHYTHM SLEEP DISORDER, SHIFT WORK TYPE: ICD-10-CM

## 2017-12-19 DIAGNOSIS — F12.10 CANNABIS ABUSE, UNCOMPLICATED: ICD-10-CM

## 2017-12-19 DIAGNOSIS — G40.909 EPILEPSY, UNSPECIFIED, NOT INTRACTABLE, WITHOUT STATUS EPILEPTICUS: ICD-10-CM

## 2017-12-19 DIAGNOSIS — M25.562 PAIN IN LEFT KNEE: ICD-10-CM

## 2017-12-19 PROBLEM — Z00.00 ENCOUNTER FOR PREVENTIVE HEALTH EXAMINATION: Status: ACTIVE | Noted: 2017-12-19

## 2017-12-19 PROCEDURE — 99214 OFFICE O/P EST MOD 30 MIN: CPT

## 2017-12-21 ENCOUNTER — FORM ENCOUNTER (OUTPATIENT)
Age: 27
End: 2017-12-21

## 2017-12-22 ENCOUNTER — OUTPATIENT (OUTPATIENT)
Dept: OUTPATIENT SERVICES | Facility: HOSPITAL | Age: 27
LOS: 1 days | End: 2017-12-22
Payer: COMMERCIAL

## 2017-12-22 ENCOUNTER — APPOINTMENT (OUTPATIENT)
Dept: ORTHOPEDIC SURGERY | Facility: CLINIC | Age: 27
End: 2017-12-22
Payer: COMMERCIAL

## 2017-12-22 ENCOUNTER — APPOINTMENT (OUTPATIENT)
Dept: RADIOLOGY | Facility: CLINIC | Age: 27
End: 2017-12-22

## 2017-12-22 VITALS — HEIGHT: 74 IN | WEIGHT: 190 LBS | BODY MASS INDEX: 24.38 KG/M2 | RESPIRATION RATE: 16 BRPM

## 2017-12-22 DIAGNOSIS — F17.200 NICOTINE DEPENDENCE, UNSPECIFIED, UNCOMPLICATED: ICD-10-CM

## 2017-12-22 DIAGNOSIS — Z78.9 OTHER SPECIFIED HEALTH STATUS: ICD-10-CM

## 2017-12-22 DIAGNOSIS — Z91.89 OTHER SPECIFIED PERSONAL RISK FACTORS, NOT ELSEWHERE CLASSIFIED: ICD-10-CM

## 2017-12-22 DIAGNOSIS — F19.90 OTHER PSYCHOACTIVE SUBSTANCE USE, UNSPECIFIED, UNCOMPLICATED: ICD-10-CM

## 2017-12-22 DIAGNOSIS — Z82.61 FAMILY HISTORY OF ARTHRITIS: ICD-10-CM

## 2017-12-22 DIAGNOSIS — Z86.69 PERSONAL HISTORY OF OTHER DISEASES OF THE NERVOUS SYSTEM AND SENSE ORGANS: ICD-10-CM

## 2017-12-22 PROCEDURE — 99204 OFFICE O/P NEW MOD 45 MIN: CPT

## 2017-12-22 PROCEDURE — 73030 X-RAY EXAM OF SHOULDER: CPT | Mod: 26,RT

## 2017-12-22 PROCEDURE — 73030 X-RAY EXAM OF SHOULDER: CPT

## 2017-12-22 RX ORDER — ZONISAMIDE 100 MG/1
100 CAPSULE ORAL
Qty: 30 | Refills: 0 | Status: ACTIVE | COMMUNITY
Start: 2017-12-16

## 2017-12-26 PROBLEM — G47.26 SHIFT WORK SLEEP DISORDER: Status: ACTIVE | Noted: 2017-12-26

## 2017-12-30 ENCOUNTER — APPOINTMENT (OUTPATIENT)
Dept: MRI IMAGING | Facility: CLINIC | Age: 27
End: 2017-12-30

## 2017-12-30 ENCOUNTER — OUTPATIENT (OUTPATIENT)
Dept: OUTPATIENT SERVICES | Facility: HOSPITAL | Age: 27
LOS: 1 days | End: 2017-12-30

## 2018-01-01 ENCOUNTER — FORM ENCOUNTER (OUTPATIENT)
Age: 28
End: 2018-01-01

## 2018-01-02 ENCOUNTER — OUTPATIENT (OUTPATIENT)
Dept: OUTPATIENT SERVICES | Facility: HOSPITAL | Age: 28
LOS: 1 days | End: 2018-01-02

## 2018-01-02 ENCOUNTER — APPOINTMENT (OUTPATIENT)
Dept: MRI IMAGING | Facility: CLINIC | Age: 28
End: 2018-01-02
Payer: COMMERCIAL

## 2018-01-02 PROCEDURE — 73221 MRI JOINT UPR EXTREM W/O DYE: CPT | Mod: 26,RT

## 2018-01-03 ENCOUNTER — APPOINTMENT (OUTPATIENT)
Dept: ORTHOPEDIC SURGERY | Facility: CLINIC | Age: 28
End: 2018-01-03
Payer: COMMERCIAL

## 2018-01-03 VITALS — RESPIRATION RATE: 16 BRPM | HEIGHT: 74 IN | WEIGHT: 190 LBS | BODY MASS INDEX: 24.38 KG/M2

## 2018-01-03 DIAGNOSIS — S46.009A UNSPECIFIED INJURY OF MUSCLE(S) AND TENDON(S) OF THE ROTATOR CUFF OF UNSPECIFIED SHOULDER, INITIAL ENCOUNTER: ICD-10-CM

## 2018-01-03 DIAGNOSIS — M25.511 PAIN IN RIGHT SHOULDER: ICD-10-CM

## 2018-01-03 PROCEDURE — 99214 OFFICE O/P EST MOD 30 MIN: CPT

## 2018-01-05 PROBLEM — S46.009A ROTATOR CUFF INJURY: Noted: 2017-12-21

## 2018-01-05 PROBLEM — M25.511 ACUTE PAIN OF RIGHT SHOULDER: Noted: 2017-12-22

## 2018-01-26 ENCOUNTER — APPOINTMENT (OUTPATIENT)
Dept: ORTHOPEDIC SURGERY | Facility: CLINIC | Age: 28
End: 2018-01-26
Payer: COMMERCIAL

## 2018-01-26 VITALS — HEIGHT: 74 IN | RESPIRATION RATE: 16 BRPM | WEIGHT: 190 LBS | BODY MASS INDEX: 24.38 KG/M2

## 2018-01-26 DIAGNOSIS — S43.431D SUPERIOR GLENOID LABRUM LESION OF RIGHT SHOULDER, SUBSEQUENT ENCOUNTER: ICD-10-CM

## 2018-01-26 DIAGNOSIS — M25.311 OTHER INSTABILITY, RIGHT SHOULDER: ICD-10-CM

## 2018-01-26 PROCEDURE — 99213 OFFICE O/P EST LOW 20 MIN: CPT

## 2018-01-26 RX ORDER — MELOXICAM 15 MG/1
15 TABLET ORAL DAILY
Qty: 30 | Refills: 0 | Status: DISCONTINUED | COMMUNITY
Start: 2017-12-22 | End: 2018-01-26

## 2018-02-21 ENCOUNTER — APPOINTMENT (OUTPATIENT)
Dept: ORTHOPEDIC SURGERY | Facility: CLINIC | Age: 28
End: 2018-02-21

## 2018-07-26 PROBLEM — Z78.9 ALCOHOL USE: Status: ACTIVE | Noted: 2017-12-19

## 2019-08-01 NOTE — DISCHARGE NOTE ADULT - MEDICATION SUMMARY - MEDICATIONS TO STOP TAKING
I will STOP taking the medications listed below when I get home from the hospital:  None
[id number] Comprehensive

## 2020-05-07 NOTE — H&P ADULT - PROBLEM SELECTOR PROBLEM 2
Preventative health care Detail Level: Simple Continue Regimen: Ciclopirox cream BID Lactic acidosis

## 2022-05-03 NOTE — PROGRESS NOTE ADULT - I WAS PHYSICALLY PRESENT FOR THE KEY PORTIONS OF THE EVALUATION AND MANAGEMENT (E/M) SERVICE PROVIDED.  I AGREE WITH THE ABOVE HISTORY, PHYSICAL, AND PLAN WHICH I HAVE REVIEWED AND EDITED WHERE APPROPRIATE
Artificial Tears: One drop to both eyes 3-4 times daily. We recommend Refresh lubricating eye drops which can be found at any pharmacy. Statement Selected

## 2022-09-14 NOTE — ED PROVIDER NOTE - CONSTITUTIONAL NEGATIVE STATEMENT, MLM
Here for concern of withdrawal to opioids the the last hour. Stated hard to sit still, muscle spasm, chills, and sweating. Stated took suboxone at midnights. Stated smoked fentanyl about 12 hours ago. ABCs intact.      Triage Assessment     Row Name 09/14/22 0234       Triage Assessment (Adult)    Airway WDL WDL       Respiratory WDL    Respiratory WDL WDL       Cardiac WDL    Cardiac WDL WDL               no fever and no chills.

## 2024-05-31 NOTE — ED PROVIDER NOTE - NS ED MD DISPO ADMIT [facility]
I sent a message to patient in February to schedule a visit. Can you see if there was medication sent prior to this one that said no more refills until seen. If not, this will be the last refill. Only send 30 days.    NEUROLOGY

## 2025-05-09 NOTE — H&P ADULT - CLICK TO LAUNCH ORM
CERTIFICATE OF WORK       May 9, 2025      Re: Mary Kate Lou  [address]  [facility] [id number]      This is to certify that Mary Kate Lou has been under my care from 5/9/2025 and can return to regular work on 5/9/2025    RESTRICTIONS:       REMARKS:         SIGNATURE:___________________________________________          Ashanti Monzon NP  [facility], [address]  [address]  [address] [address]  Phone: [phone]  Fax: [phone]  
.